# Patient Record
Sex: MALE | Race: WHITE
[De-identification: names, ages, dates, MRNs, and addresses within clinical notes are randomized per-mention and may not be internally consistent; named-entity substitution may affect disease eponyms.]

---

## 2017-05-31 ENCOUNTER — HOSPITAL ENCOUNTER (OUTPATIENT)
Dept: HOSPITAL 61 - KCIC | Age: 62
Discharge: HOME | End: 2017-05-31
Attending: PHYSICIAN ASSISTANT
Payer: COMMERCIAL

## 2017-05-31 DIAGNOSIS — M25.571: Primary | ICD-10-CM

## 2017-05-31 DIAGNOSIS — M79.89: ICD-10-CM

## 2017-05-31 PROCEDURE — 73610 X-RAY EXAM OF ANKLE: CPT

## 2017-05-31 NOTE — [facility]
EXAM: Right ankle, 3 views.

 

HISTORY: Medial pain.

 

COMPARISON: None.

 

FINDINGS: Frontal, lateral and mortise views of the right ankle are 

obtained. There is no acute fracture, dislocation or subluxation. The 

ankle mortise is intact. No osteochondral lesion is seen. There is medial 

ankle soft tissue swelling. There is a small plantar spur.

 

IMPRESSION: 

1. Suspected mild medial ankle soft tissue swelling.

2. Small plantar spur.

 

Electronically signed by: Trang Brenner MD (5/31/2017 12:18 PM)

## 2019-01-29 ENCOUNTER — HOSPITAL ENCOUNTER (OUTPATIENT)
Dept: HOSPITAL 61 - KCIC MRI | Age: 64
Discharge: HOME | End: 2019-01-29
Attending: OTOLARYNGOLOGY
Payer: COMMERCIAL

## 2019-01-29 DIAGNOSIS — H90.41: Primary | ICD-10-CM

## 2019-01-29 PROCEDURE — A9585 GADOBUTROL INJECTION: HCPCS

## 2019-01-29 PROCEDURE — 70553 MRI BRAIN STEM W/O & W/DYE: CPT

## 2019-01-29 NOTE — [facility]
EXAMINATION: Magnetic resonance imaging (MRI) of the brain and brainstem 

without and with contrast

 

HISTORY: Hearing loss. Right ear hearing loss for one month.

 

TECHNIQUE: Multiplanar multi-weighted MRI of the brain and brainstem was 

performed without and with intravenous contrast using the internal 

auditory canal protocol. This included sequences detailing the internal 

auditory canals and posterior fossa

 

Contrast information:

13 mL Gadolinium based contrast

 

COMPARISON: None available.

 

FINDINGS: 

 

The cerebellopontine angles are normal, with no evidence of extra-axial 

mass or aneurysm.  The VII/VIII nerve complexes appear normal.  There are 

no areas of abnormal contrast enhancement.  The upper cervical spinal cord

and spine are normal. There are T2/FLAIR signal hyperintense foci in the 

periventricular and subcortical white matter most suggestive of mild 

chronic small vessel ischemic changes.

 

The scalp and calvarium are normal. The superior sagittal sinus 

demonstrates normal venous flow.  The corpus callosum is normal in shape 

and signal intensity. The posterior fossa is unremarkable.  The pituitary 

and sella are normal.  The brainstem and craniocervical junction are 

unremarkable.

 

 

Diffusion weighted images reveal no hyperintensities to suggest acute 

cerebral infarction. The susceptibility weighted sequences reveal no 

evidence of acute or chronic hemorrhage. The ventricles are normal in size

and position without evidence of hydrocephalus.

 

The paranasal sinuses are normal.  The visualized portions of the mastoids

are unremarkable. The orbits appear normal.  Normal flow voids are 

demonstrated in the carotid arteries and basilar artery.

 

IMPRESSION:

No lesions in the posterior fossa, internal auditory canals, or temporal 

bones to explain the patient's hearing loss.

 

There are T2/FLAIR signal hyperintense foci in the periventricular and 

subcortical white matter most suggestive of mild chronic small vessel 

ischemic changes. No evidence for acute or subacute ischemia.

 

Electronically signed by: Zohreh Julio MD (1/29/2019 1:10 PM) 

[facility]

## 2021-03-09 ENCOUNTER — HOSPITAL ENCOUNTER (OUTPATIENT)
Dept: HOSPITAL 61 - LAB | Age: 66
End: 2021-03-09
Attending: PODIATRIST
Payer: MEDICARE

## 2021-03-09 DIAGNOSIS — Z01.812: Primary | ICD-10-CM

## 2021-03-09 DIAGNOSIS — M20.41: ICD-10-CM

## 2021-03-09 DIAGNOSIS — Z20.822: ICD-10-CM

## 2021-03-09 PROCEDURE — U0003 INFECTIOUS AGENT DETECTION BY NUCLEIC ACID (DNA OR RNA); SEVERE ACUTE RESPIRATORY SYNDROME CORONAVIRUS 2 (SARS-COV-2) (CORONAVIRUS DISEASE [COVID-19]), AMPLIFIED PROBE TECHNIQUE, MAKING USE OF HIGH THROUGHPUT TECHNOLOGIES AS DESCRIBED BY CMS-2020-01-R: HCPCS

## 2021-03-12 ENCOUNTER — HOSPITAL ENCOUNTER (OUTPATIENT)
Dept: HOSPITAL 61 - SURG | Age: 66
Discharge: HOME | End: 2021-03-12
Attending: PODIATRIST
Payer: MEDICARE

## 2021-03-12 VITALS — WEIGHT: 243 LBS | BODY MASS INDEX: 32.91 KG/M2 | HEIGHT: 72 IN

## 2021-03-12 VITALS — SYSTOLIC BLOOD PRESSURE: 140 MMHG | DIASTOLIC BLOOD PRESSURE: 74 MMHG

## 2021-03-12 DIAGNOSIS — K21.9: ICD-10-CM

## 2021-03-12 DIAGNOSIS — Z72.89: ICD-10-CM

## 2021-03-12 DIAGNOSIS — M20.41: Primary | ICD-10-CM

## 2021-03-12 DIAGNOSIS — E03.9: ICD-10-CM

## 2021-03-12 DIAGNOSIS — Z79.899: ICD-10-CM

## 2021-03-12 DIAGNOSIS — G47.30: ICD-10-CM

## 2021-03-12 DIAGNOSIS — Z98.890: ICD-10-CM

## 2021-03-12 LAB
ALBUMIN SERPL-MCNC: 3.6 G/DL (ref 3.4–5)
ALBUMIN/GLOB SERPL: 1 {RATIO} (ref 1–1.7)
ALP SERPL-CCNC: 80 U/L (ref 46–116)
ALT SERPL-CCNC: 31 U/L (ref 16–63)
ANION GAP SERPL CALC-SCNC: 6 MMOL/L (ref 6–14)
APTT BLD: 28 SEC (ref 24–38)
AST SERPL-CCNC: 23 U/L (ref 15–37)
BASOPHILS # BLD AUTO: 0 X10^3/UL (ref 0–0.2)
BASOPHILS NFR BLD: 0 % (ref 0–3)
BILIRUB SERPL-MCNC: 0.4 MG/DL (ref 0.2–1)
BUN SERPL-MCNC: 19 MG/DL (ref 8–26)
BUN/CREAT SERPL: 19 (ref 6–20)
CALCIUM SERPL-MCNC: 8.6 MG/DL (ref 8.5–10.1)
CHLORIDE SERPL-SCNC: 105 MMOL/L (ref 98–107)
CO2 SERPL-SCNC: 30 MMOL/L (ref 21–32)
CREAT SERPL-MCNC: 1 MG/DL (ref 0.7–1.3)
EOSINOPHIL NFR BLD: 0.1 X10^3/UL (ref 0–0.7)
EOSINOPHIL NFR BLD: 1 % (ref 0–3)
ERYTHROCYTE [DISTWIDTH] IN BLOOD BY AUTOMATED COUNT: 13.6 % (ref 11.5–14.5)
GFR SERPLBLD BASED ON 1.73 SQ M-ARVRAT: 75 ML/MIN
GLUCOSE SERPL-MCNC: 92 MG/DL (ref 70–99)
HCT VFR BLD CALC: 42.2 % (ref 39–53)
HGB BLD-MCNC: 14.3 G/DL (ref 13–17.5)
LYMPHOCYTES # BLD: 1.9 X10^3/UL (ref 1–4.8)
LYMPHOCYTES NFR BLD AUTO: 29 % (ref 24–48)
MCH RBC QN AUTO: 32 PG (ref 25–35)
MCHC RBC AUTO-ENTMCNC: 34 G/DL (ref 31–37)
MCV RBC AUTO: 96 FL (ref 79–100)
MONO #: 0.5 X10^3/UL (ref 0–1.1)
MONOCYTES NFR BLD: 8 % (ref 0–9)
NEUT #: 4 X10^3/UL (ref 1.8–7.7)
NEUTROPHILS NFR BLD AUTO: 62 % (ref 31–73)
PLATELET # BLD AUTO: 155 X10^3/UL (ref 140–400)
POTASSIUM SERPL-SCNC: 4.5 MMOL/L (ref 3.5–5.1)
PROT SERPL-MCNC: 7.1 G/DL (ref 6.4–8.2)
PROTHROMBIN TIME: 13 SEC (ref 11.7–14)
RBC # BLD AUTO: 4.4 X10^6/UL (ref 4.3–5.7)
SODIUM SERPL-SCNC: 141 MMOL/L (ref 136–145)
WBC # BLD AUTO: 6.5 X10^3/UL (ref 4–11)

## 2021-03-12 PROCEDURE — A4930 STERILE, GLOVES PER PAIR: HCPCS

## 2021-03-12 PROCEDURE — 28285 REPAIR OF HAMMERTOE: CPT

## 2021-03-12 PROCEDURE — A6223 GAUZE >16<=48 NO W/SAL W/O B: HCPCS

## 2021-03-12 PROCEDURE — A6443 CONFORM BAND N/S W>=3"<5"/YD: HCPCS

## 2021-03-12 PROCEDURE — A6449 LT COMPRES BAND >=3" <5"/YD: HCPCS

## 2021-03-12 PROCEDURE — A4215 STERILE NEEDLE: HCPCS

## 2021-03-12 PROCEDURE — A4657 SYRINGE W/WO NEEDLE: HCPCS

## 2021-03-12 PROCEDURE — 93005 ELECTROCARDIOGRAM TRACING: CPT

## 2021-03-12 PROCEDURE — 85610 PROTHROMBIN TIME: CPT

## 2021-03-12 PROCEDURE — 85730 THROMBOPLASTIN TIME PARTIAL: CPT

## 2021-03-12 PROCEDURE — 85025 COMPLETE CBC W/AUTO DIFF WBC: CPT

## 2021-03-12 PROCEDURE — A6402 STERILE GAUZE <= 16 SQ IN: HCPCS

## 2021-03-12 PROCEDURE — 73630 X-RAY EXAM OF FOOT: CPT

## 2021-03-12 PROCEDURE — 80053 COMPREHEN METABOLIC PANEL: CPT

## 2021-03-12 PROCEDURE — A4213 20+ CC SYRINGE ONLY: HCPCS

## 2021-03-12 PROCEDURE — 36415 COLL VENOUS BLD VENIPUNCTURE: CPT

## 2021-03-12 NOTE — OP
DATE OF SURGERY:  03/12/2021



PREOPERATIVE DIAGNOSIS:  Hammertoe right 4th digit with pre-ulcerative lesion,

right fourth digit proximal interphalangeal joint.



POSTOPERATIVE DIAGNOSIS:  Hammertoe right 4th digit with pre-ulcerative lesion,

right fourth digit proximal interphalangeal joint.



SURGEON:  Mary Lou Suárez DPM



PROCEDURE PERFORMED:  Hammertoe correction with arthroplasty, right fourth digit

with K-wire fixation.



HEMOSTASIS:  Right ankle tourniquet set at 250 mmHg.



ANESTHESIA:  IV MAC sedation with local anesthesia in a digital block fashion at

the right fourth digit using 10 mL of 1:1 mixture of 0.5% Marcaine plain and 1%

lidocaine plain.



INDICATIONS:  This patient is a 65-year-old male with a chronically painful

right fourth digit with pre-ulcerative lesion.  There was a corn due to the

hammertoe deformity of the right fourth digit.  The patient tried conservative

treatment with wider shoes and accommodative padding and debridement of the

lesion with no improvement.  The patient got vascular testing prior to the

procedure.  Procedure was discussed in detail with the patient as well as

postoperative course its risks, benefits, complications including delayed

healing, nonhealing, need for further surgery, infection, chronic regional pain

syndrome, recurrence, numbness, tingling, burning, loss of sensation, blood

clots to the leg, blood clots to the lung, overcorrection, under correction,

stiff toe, floppy toe, flail toe, lack of purchase of the toe as well as other

complications were all discussed in detail with the patient.  All questions were

answered.  No guarantees were made.  The patient signed the consent form freely

and it was placed in the chart.



DESCRIPTION OF PROCEDURE:  The patient was transported to the operating room via

cart and placed on the operating room table in a supine position.  After

verification of the patient, the procedure and the site, a well-padded right

ankle tourniquet was placed.  IV sedation was performed by Anesthesia service

and a local anesthetic block was performed at the right fourth ray consisting of

1:1 mixture of 0.5% Marcaine plain and 1% lidocaine plain using 10 mL total. 

The right foot was then prepped and draped in the usual aseptic manner.  Esmarch

bandage was used to exsanguinate the right foot and right ankle tourniquet was

inflated to 250 mmHg.  Attention was then drawn to the right fourth digit PIPJ

where 2 elliptical incisions were made to excise the corn and a pre-ulcerative

lesion that was visible.  Next, incision was deepened with care taken to protect

the neurovascular bundle.  The incision was deepened to the joint capsule of the

PIPJ.  A horizontal incision was made along the extensor tendon at the joint

capsule and the joint capsule was reflected off the proximal phalanx head of the

right fourth digit.  Next, the proximal phalanx head was resected about 3 mm in

length and it was noted that the toe position was still not in rectus position. 

Decision was made to do a flexor tenotomy using a separate incision to the

medial plantar aspect of the right fourth digit.  The toe was then noted to be

in rectus position.  Next, a K-wire was inserted from the distal tip to the

proximal phalanx, keeping the position in a rectus position.  The K-wire was cut

to the right position and Jurgan ball was applied to protect the distal tip

extending out of the toe.  The incision site was copiously irrigated with

sterile saline solution.  The extensor tendon was sutured together with 3-0

Vicryl.  The skin incision at the dorsal aspect and the side of the digit was

also sutured together using 4-0 nylon.  The tourniquet was then deflated with

good perfusion noted to the digit.  Bacitracin ointment, Adaptic, 4 x 4 gauze,

Jolly roll and Ace wrap bandage was then applied to the surgical site.  There

was good perfusion noted to the digit when the tourniquet was deflated.  The

patient tolerated the anesthesia and procedure well and was transported to the

PACU in stable condition with vascular status intact to the right foot.  Postop

instructions were placed in the chart.  The patient to be minimal weightbearing

as tolerated in a surgical shoe.  The patient is to follow up in clinic in 5

days or sooner if any issues.  The patient was given postoperative medication

for pain as well as stool softener for possible constipation.  The patient to

follow up in clinic.

 



______________________________

MARY LOU SUÁREZ DPM



DR:  IMMANUEL/charisse  JOB#:  [id number] / [id number]

DD:  03/12/2021 18:53  DT:  03/12/2021 19:16

## 2021-03-12 NOTE — PDOC1
History and Physical


Date of Admission


Date of Admission


DATE: 3/12/21 


TIME: 09:43





Identification/Chief Complaint


Chief Complaint


right foot, 4th toe pain x 2 yrs, now worse, here for corrective surgery





History of Present Illness


History of Present Illness


65 yr old wm here for outpatient surgery, has lost 40# due to recent diet 

changes, denies other symptoms, due for colonoscopy soon, has had 2 

colonoscopies in the past without events





GERD, HYPOTHYROID ON SYNTHROID RX





Past Medical History


Past Medical History


history of obstructive sleep apnea and has been 


on CPAP at 9 cm water with good compliance


Cardiovascular:  No pertinent hx


Pulmonary:  No pertinent hx


Heme/Onc:  No pertinent hx


Hepatobiliary:  No pertinent hx


Musculoskeletal:  Osteoarthritis


Infectious disease:  No pertinent hx


ENT:  No pertinent hx


Renal/:  No pertinent hx


Endocrine:  No pertinent hx


Dermatology:  No pertinent hx





Family History


Family History:  Hypertension





Social History


Smoke:  No


ALCOHOL:  none


Drugs:  None





Current Medications


Current Medications





Current Medications


Fentanyl Citrate (Fentanyl 2ml Vial) 25 mcg PRN Q5MIN  PRN IVP MILD PAIN 1-3;  

Start 3/12/21 at 06:00;  Stop 3/13/21 at 05:59


Fentanyl Citrate (Fentanyl 2ml Vial) 50 mcg PRN Q5MIN  PRN IVP MODERATE PAIN 4-6

;  Start 3/12/21 at 06:00;  Stop 3/13/21 at 05:59


Morphine Sulfate (Morphine Sulfate) 1 mg PRN Q10MIN  PRN IVP SEVERE PAIN 7-10;  

Start 3/12/21 at 06:00;  Stop 3/13/21 at 05:59


Ringer's Solution 1,000 ml @  30 mls/hr Q24H IV ;  Start 3/12/21 at 06:00;  Stop

3/12/21 at 17:59


Hydromorphone HCl (Dilaudid) 0.5 mg PRN Q10MIN  PRN IVP SEVERE PAIN 7-10, 2nd 

CHOICE;  Start 3/12/21 at 06:00;  Stop 3/13/21 at 05:59


Prochlorperazine Edisylate (Compazine) 5 mg PACU PRN  PRN IVP NAUSEA, MRX1;  

Start 3/12/21 at 06:00;  Stop 3/13/21 at 05:59


Cefazolin Sodium/ Dextrose 50 ml @  100 mls/hr 1X PREOP  PRN IV PRIOR TO 

PROCEDURE;  Start 3/12/21 at 06:00;  Stop 3/12/21 at 18:00


Povidone Iodine (Betadine Oint) 28 ambrosio STK-MED ONCE TP ;  Start 3/12/21 at 

09:02;  Stop 3/12/21 at 09:02;  Status DC


Lidocaine HCl (Xylocaine 1% Pf 30ml Vial) 30 ml STK-MED ONCE .ROUTE ;  Start 

3/12/21 at 09:02;  Stop 3/12/21 at 09:02;  Status DC


Dexamethasone Sodium Phosphate (Decadron) 4 mg STK-MED ONCE .ROUTE ;  Start 

3/12/21 at 09:02;  Stop 3/12/21 at 09:02;  Status DC


Bupivacaine HCl (Sensorcaine Mpf 0.5%) 30 ml STK-MED ONCE .ROUTE ;  Start 

3/12/21 at 09:03;  Stop 3/12/21 at 09:03;  Status DC





Active Scripts


Active


Reported


Daily Value (Multivitamin) 1 Each Tablet 1 Each PO DAILY


Omeprazole 20 Mg Capsule.dr 20 Mg PO DAILY


Levothyroxine Sodium 50 Mcg Tablet 50 Mcg PO DAILYAC





Allergies


Allergies:  


Coded Allergies:  


     No Known Drug Allergies (Unverified , 3/12/21)





ROS


Review of System


14 PT ROS OTHERWISE NEG


General:  No: Chills, Night Sweats, Fatigue, Malaise, Appetite, Other


PSYCHOLOGICAL ROS:  No: Anxiety, Behavioral Disorder, Concentration difficultie,

Decreased libido, Depression, Disorientation, Hallucinations, Hostility, 

Irritablity, Memory difficulties, Mood Swings, Obsessive thoughts, Physical 

abuse, Sexual abuse, Sleep disturbances, Suicidal ideation, Other


Eyes:  No Blurry vision, No Decreased vision, No Double vision, No Dry eyes, No 

Excessive tearing, No Eye Pain, No Itchy Eyes, No Loss of vision, No Photoph

obia, No Scotomata, No Uses contacts, No Uses glasses, No Other


HEENT:  YES: Hearing change, Snoring, Other (CHRONIC R SIDED HEARING LOSS, 

FOLLOWED BY ENT); 


   No: Heacaches, Visual Changes, Nasal congestion, Nasal discharge, Oral 

lesions, Sinus pain, Sore Throat, Epistaxis, Sneezing, Tinnitus, Vertigo, Vocal 

changes


ALLERGY AND IMMUNOLOGY:  No: Hives, Insect Bite Sensitivity, Itchy/Watery Eyes, 

Nasal Congestion, Post Nasal Drip, Seasonal Allergies, Other


Hematological and Lymphatic:  No: Bleeding Problems, Blood Clots, Blood 

Transfusions, Brusing, Night Sweats, Pallor, Swollen Lymph Nodes, Other


ENDOCRINE:  No: Breast Changes, Galactorrhea, Hair Pattern Changes, Hot Flashes,

Malaise/lethargy, Mood Swings, Palpitations, Polydipsia/polyuria, Skin Changes, 

Temperature Intolerance, Unexpected Weight Changes, Other


Breast:  No New/Changing Breast Lumps, No Nipple changes, No Nipple discharge, 

No Other


Respiratory:  No: Cough, Hemoptysis, Orthopnea, Pleuritic Pain, Shortness of 

breath, SOB with excertion, Sputum Changes, Stridor, Tachypnea, Wheezing, Other


Cardiovascular:  No Chest Pain, No Palpitations, No Orthopnea, No Paroxysmal 

Noc. Dyspnea, No Edema, No Lt Headedness, No Other


Gastrointestinal:  No Nausea, No Vomiting, No Abdominal Pain, No Diarrhea, No 

Constipation, No Melena, No Hematochezia, No Other


Genitourinary:  No Dysuria, No Frequency, No Incontinence, No Hematuria, No 

Retention, No Discharge, No Urgency, No Pain, No Flank Pain, No Other, No , No ,

No , No , No , No , No 


Musculoskeletal:  Yes Gait Disturbance, Yes Joint Pain, Yes Joint Stiffness, Yes

Joint Swelling; 


   No Muscle Pain, No Muscular Weakness, No Pain In:, No Swelling In:, No Other


Neurological:  Yes Gait Disturbance; 


   No Behavorial Changes, No Bowel/Bladder ControlChng, No Confusion, No 

Dizziness, No Headaches, No Impaired Coord/balance, No Memory Loss, No 

Numbness/Tingling, No Seizures, No Speech Problems, No Tremors, No Visual 

Changes, No Weakness, No Other


Skin:  No Dry Skin, No Eczema, No Hair Changes, No Lumps, No Mole Changes, No 

Mottling, No Nail Changes, No Pruritus, No Rash, No Skin Lesion Changes, No 

Other, No Acne





Physical Exam


General:  Alert, Oriented X3, Cooperative, No acute distress


HEENT:  PERRLA, EOMI, Mucous membr. moist/pink


Lungs:  Clear to auscultation, Normal air movement


Heart:  S1S2, RRR, no thrills, no rubs, no gallops, no murmurs


Breasts:  Not examined


Abdomen:  Normal bowel sounds, Soft, No tenderness, No hepatosplenomegaly, No 

masses


Rectal Exam:  not examined, deferred


PELVIC:  Examination not indicated


Extremities:  No clubbing, No cyanosis, No edema


Neuro:  Normal speech, Normal tone, Cranial nerves 3-12 NL


Psych/Mental Status:  Mental status NL, Mood NL





Images


Images





PATIENT: SHAUN PERRY   ACCOUNT: [id number]     MRN#: [id number]


: 1955           LOCATION: [facility] MRI        AGE: 63


SEX: M                    EXAM DT: 19         ACCESSION#: [id number]


STATUS: REG CLI           ORD. PHYSICIAN: EFRAIN CASTRO MD


REASON: HEARING LOSS


PROCEDURE: BRAIN WO/W CONTRAST





EXAMINATION: Magnetic resonance imaging (MRI) of the brain and brainstem 


without and with contrast


 


HISTORY: Hearing loss. Right ear hearing loss for one month.


 


TECHNIQUE: Multiplanar multi-weighted MRI of the brain and brainstem was 


performed without and with intravenous contrast using the internal 


auditory canal protocol. This included sequences detailing the internal 


auditory canals and posterior fossa


 


Contrast information:


13 mL Gadolinium based contrast


 


COMPARISON: None available.


 


FINDINGS: 


 


The cerebellopontine angles are normal, with no evidence of extra-axial 


mass or aneurysm.  The VII/VIII nerve complexes appear normal.  There are 


no areas of abnormal contrast enhancement.  The upper cervical spinal cord


and spine are normal. There are T2/FLAIR signal hyperintense foci in the 


periventricular and subcortical white matter most suggestive of mild 


chronic small vessel ischemic changes.


 


The scalp and calvarium are normal. The superior sagittal sinus 


demonstrates normal venous flow.  The corpus callosum is normal in shape 


and signal intensity. The posterior fossa is unremarkable.  The pituitary 


and sella are normal.  The brainstem and craniocervical junction are 


unremarkable.


 


 


Diffusion weighted images reveal no hyperintensities to suggest acute 


cerebral infarction. The susceptibility weighted sequences reveal no 


evidence of acute or chronic hemorrhage. The ventricles are normal in size


and position without evidence of hydrocephalus.


 


The paranasal sinuses are normal.  The visualized portions of the mastoids


are unremarkable. The orbits appear normal.  Normal flow voids are 


demonstrated in the carotid arteries and basilar artery.


 


IMPRESSION:


No lesions in the posterior fossa, internal auditory canals, or temporal 


bones to explain the patient's hearing loss.


 


There are T2/FLAIR signal hyperintense foci in the periventricular and 


subcortical white matter most suggestive of mild chronic small vessel 


ischemic changes. No evidence for acute or subacute ischemia.


 


Electronically signed by: Rivas Stanton MD (2019 1:10 PM) 


[facility]














DICTATED and SIGNED BY:     RIVAS STANTON MD


DATE:     19 1307





VTE Prophylaxis Ordered


VTE Prophylaxis Devices:  Yes


VTE Pharmacological Prophylaxi:  No





Assessment/Plan


Assessment/Plan


impression





right 4th toe pain, hammertoe


obesity


BUDDY


hx hearing loss , RIGHT, stable 


intentional weight loss, recent,  due to dietary changes and exercising 


covid 19 neg  3-09 


Small plantar spur. hx right foot 


gait disturbance














=======================











PLAN==========








CBC, COMP. INR, if wnl at low risk for latrice-operative complications


scd's left leg


colonoscopy soon per GI directions  


pre-op ekg





Justifications for Admission


Other Justification














GARRY MARKS MD          Mar 12, 2021 09:45 Patient came in requesting refills of:     Meloxicam  Losartan  Sertraline    To be faxed over to [facility].    Please contact pt with any questions.

## 2021-03-12 NOTE — PDOC4
OPERATIVE NOTE:


Podiatry Post-op Note





Date of Surgery: 3/12/21


Pre-operative diagnosis: Hammer toe right 4th digit


Post-op diagnosis: Same as above


Surgeon: Dr. Mary Lou Suárez DPM


Procedure performed: Hammer toe correction right 4th digit with k-wire fixation


Anesthesia: IV Sedation with 10 ml of 1:1 mixture of 0.5% marcaine plain and 

1.0% lidocaine plain


Hemostasis: Right ankle tourniquet set at 250mmHg


Materials used: 0.045 k-wire, 4-0 nylon, 3-0 vicryl





Patient tolerated the anesthesia and procedure well and was transported back to 

PACU with vital signs stable and good perfusion to the digits on the right foot.

Patient to followup in clinic in 5 days and to call the clinic prior to it if 

any issues.





Mary Lou Suárez


[phone]











MARY LOU SUÁREZ DPM                  Mar 12, 2021 12:59

## 2021-03-12 NOTE — RAD
EXAM: Right foot, 3 views.



HISTORY: Hammertoe deformity. Postoperative evaluation.



COMPARISON: None.



FINDINGS: 3 views of the right foot are obtained. There is a percutaneous wire traversing the fourth 
phalanx and there has been an osteotomy involving the distal aspect of the fourth proximal phalanx. T
here is a hallux valgus deformity. There is degenerative spurring involving the first metatarsal phal
angeal joint. There is an erosion involving the first metatarsal head. There is a healed distal secon
d metatarsal fracture. There is internal fixation of medial malleolar fractures. There is a small odette
ntar spur.



IMPRESSION: 

1. Instrumentation due to fourth hammertoe deformity surgery. There has been resection of the distal 
fourth proximal phalanx and there is a percutaneous wire traversing the fourth phalanx.

2. Hallux valgus with mild first metatarsal phalangeal joint osteoarthritis and suspected metatarsal 
head erosion.

4. Healed distal fourth metatarsal fracture and internal fixation of bimalleolar fractures.



Electronically signed by: Trang Brenner MD (3/12/2021 1:51 PM) [id number]

## 2021-03-12 NOTE — DISCH
DISCHARGE INSTRUCTIONS


Condition on Discharge


Condition on Discharge:  Stable





Activity After Discharge


Activity Instructions for Disc:  Other, see below (Minimal weight bearing to the

right foot with a surgical shoe.)


Bathing Instructions:  No Tub Bath until see 


Lifting Instructions after Dis:  No heavy lifting, No pulling or pushing, Do not

lift >10 pounds


Driving Instructions after Dis:  Do not drive


Weight Bearing Status after Di:  Other, see below (Minimal weight bearing to the

right foot with a surgical shoe.)





Diet after Discharge


Diet after Discharge:  Regular





Wound Incision Care


Wound/Incision Care:  Keep wound/cast CDI, Keep wound elevated, Do not change 

dressing





Contacting the  after DC


Call your doctor for:  Concerns you may have





Follow-Up


Follow up with:  Dr. Suárez in 5 days (Wed - 3/17/21), please call for appointment 

[phone]











MARY LOU SUÁREZ DPM                  Mar 12, 2021 12:47

## 2021-03-12 NOTE — EKG
[facility]

               [address] [place] [address]

Test Date:    2021               Test Time:    10:17:14

Pat Name:     SHAUN PERRY             Department:   

Patient ID:   [id number]           Room:          

Gender:       M                        Technician:   JORGE LUIS

:          1955               Requested By: GARRY MARKS

Order Number: [id number]           Reading MD:     

                                 Measurements

Intervals                              Axis          

Rate:         48                       P:            49

CA:           186                      QRS:          23

QRSD:         90                       T:            30

QT:           420                                    

QTc:          378                                    

                           Interpretive Statements

SINUS BRADYCARDIA

OTHERWISE NORMAL ECG

RI6.02

No previous ECG available for comparison